# Patient Record
Sex: FEMALE | Race: WHITE | NOT HISPANIC OR LATINO | URBAN - METROPOLITAN AREA
[De-identification: names, ages, dates, MRNs, and addresses within clinical notes are randomized per-mention and may not be internally consistent; named-entity substitution may affect disease eponyms.]

---

## 2022-07-09 ENCOUNTER — TELEPHONE ENCOUNTER (OUTPATIENT)
Dept: URBAN - METROPOLITAN AREA CLINIC 121 | Facility: CLINIC | Age: 72
End: 2022-07-09

## 2022-07-10 ENCOUNTER — TELEPHONE ENCOUNTER (OUTPATIENT)
Dept: URBAN - METROPOLITAN AREA CLINIC 121 | Facility: CLINIC | Age: 72
End: 2022-07-10

## 2022-07-10 RX ORDER — LEVOTHYROXINE SODIUM 125 MCG
TABLET ORAL ONCE A DAY
Refills: 0 | Status: ACTIVE | COMMUNITY
Start: 2014-09-24

## 2022-07-30 ENCOUNTER — TELEPHONE ENCOUNTER (OUTPATIENT)
Age: 72
End: 2022-07-30

## 2022-07-31 ENCOUNTER — TELEPHONE ENCOUNTER (OUTPATIENT)
Age: 72
End: 2022-07-31

## 2023-06-27 ENCOUNTER — WEB ENCOUNTER (OUTPATIENT)
Dept: URBAN - METROPOLITAN AREA CLINIC 7 | Facility: CLINIC | Age: 73
End: 2023-06-27

## 2023-06-27 ENCOUNTER — LAB OUTSIDE AN ENCOUNTER (OUTPATIENT)
Dept: URBAN - METROPOLITAN AREA CLINIC 7 | Facility: CLINIC | Age: 73
End: 2023-06-27

## 2023-06-27 ENCOUNTER — OFFICE VISIT (OUTPATIENT)
Dept: URBAN - METROPOLITAN AREA CLINIC 7 | Facility: CLINIC | Age: 73
End: 2023-06-27
Payer: MEDICARE

## 2023-06-27 VITALS
WEIGHT: 183 LBS | TEMPERATURE: 97.3 F | BODY MASS INDEX: 33.68 KG/M2 | DIASTOLIC BLOOD PRESSURE: 78 MMHG | SYSTOLIC BLOOD PRESSURE: 130 MMHG | HEIGHT: 62 IN

## 2023-06-27 DIAGNOSIS — R74.8 ELEVATED LIVER ENZYMES: ICD-10-CM

## 2023-06-27 DIAGNOSIS — R74.01 ELEVATED LIVER TRANSAMINASE LEVEL: ICD-10-CM

## 2023-06-27 DIAGNOSIS — Z86.010 PERSONAL HISTORY OF COLONIC POLYPS: ICD-10-CM

## 2023-06-27 DIAGNOSIS — K31.89 GASTRIC MASS: ICD-10-CM

## 2023-06-27 DIAGNOSIS — K76.0 FATTY LIVER: ICD-10-CM

## 2023-06-27 DIAGNOSIS — K76.9 LIVER LESION: ICD-10-CM

## 2023-06-27 DIAGNOSIS — R79.89 ELEVATED LIVER FUNCTION TESTS: ICD-10-CM

## 2023-06-27 PROBLEM — 707724006: Status: ACTIVE | Noted: 2023-06-27

## 2023-06-27 PROBLEM — 300297002: Status: ACTIVE | Noted: 2023-06-27

## 2023-06-27 PROBLEM — 863927004: Status: ACTIVE | Noted: 2023-06-27

## 2023-06-27 PROBLEM — 428283002: Status: ACTIVE | Noted: 2023-06-27

## 2023-06-27 PROCEDURE — 99205 OFFICE O/P NEW HI 60 MIN: CPT | Performed by: STUDENT IN AN ORGANIZED HEALTH CARE EDUCATION/TRAINING PROGRAM

## 2023-06-27 RX ORDER — LEVOTHYROXINE SODIUM 125 UG/1
1 TABLET IN THE MORNING ON AN EMPTY STOMACH TABLET ORAL ONCE A DAY
Qty: 30 | Status: ACTIVE | COMMUNITY
Start: 2023-06-27

## 2023-06-27 RX ORDER — ASPIRIN 81 MG/1
1 TABLET TABLET, CHEWABLE ORAL ONCE A DAY
Qty: 30 | Status: ACTIVE | COMMUNITY
Start: 2023-06-27

## 2023-06-27 RX ORDER — METFORMIN HYDROCHLORIDE 500 MG/1
1 TABLET WITH A MEAL TABLET, FILM COATED ORAL ONCE A DAY
Qty: 30 | Status: ACTIVE | COMMUNITY
Start: 2023-06-27

## 2023-06-27 RX ORDER — LEVOTHYROXINE SODIUM 125 MCG
TABLET ORAL ONCE A DAY
Refills: 0 | Status: ON HOLD | COMMUNITY
Start: 2014-09-24

## 2023-06-27 RX ORDER — AMLODIPINE BESYLATE 5 MG/1
TABLET ORAL
Qty: 90 TABLET | Status: ACTIVE | COMMUNITY

## 2023-06-27 NOTE — HPI-TODAY'S VISIT:
73 YO Female presents for elevated liver enzymes.  Previously with mass in stomach suggesitve of lymphoma.  Tattooed but during laproscopy and no mass seen.   No follow up since then.  Needs colonoscopy for polyp surveillance, and repeat EGD for evaluation of previously seen reported lymphoma mask.  CLD for liver enxymes elevations, No FH of CLD, no excessive etoh or acetaminophen use reported.  Fibroscan S3/F0, MRI pancreas liver with evidence of fatty liver, no mass on liver but heterogenous area seen with unknown clinical significant, requested to repeat in 3 months.    ALARM SYMPTOMS --No odynophagia --No hematemesis --No melena / hematochezia --No unintentional weight loss --No iron deficiency anemia  PERTINENT GI FAMILY HISTORY Colon polyps:  no family history Colon cancer:  Mother with UC and ileostomy  GASTROINTESTINAL PROCEDURE HISTORY Colonoscopy:	 --> 8 years ago - history of polyps EGD:   --2004 - mass in stomach stomach, subsequent.    PERTINENT MEDICAL HISTORY Aspirin 81mg PO daily:   --Not Taking Other Blood Thinners:   Cardiac Disease:   --No History  Pulmonary Disease --No History  Abdominal Surgery & Hernia:  No History

## 2023-06-28 ENCOUNTER — LAB OUTSIDE AN ENCOUNTER (OUTPATIENT)
Dept: URBAN - METROPOLITAN AREA CLINIC 7 | Facility: CLINIC | Age: 73
End: 2023-06-28

## 2023-06-28 PROBLEM — 300331000: Status: ACTIVE | Noted: 2023-06-28

## 2023-07-05 ENCOUNTER — TELEPHONE ENCOUNTER (OUTPATIENT)
Dept: URBAN - METROPOLITAN AREA CLINIC 7 | Facility: CLINIC | Age: 73
End: 2023-07-05

## 2023-07-05 RX ORDER — SOD SULF/POT CHLORIDE/MAG SULF 1.479 G
AS DIRECTED TABLET ORAL ONCE
Qty: 1 KIT | Refills: 0 | OUTPATIENT
Start: 2023-07-05

## 2023-07-06 ENCOUNTER — CLAIMS CREATED FROM THE CLAIM WINDOW (OUTPATIENT)
Dept: URBAN - METROPOLITAN AREA SURGERY CENTER 5 | Facility: SURGERY CENTER | Age: 73
End: 2023-07-06
Payer: MEDICARE

## 2023-07-06 ENCOUNTER — CLAIMS CREATED FROM THE CLAIM WINDOW (OUTPATIENT)
Dept: URBAN - METROPOLITAN AREA CLINIC 4 | Facility: CLINIC | Age: 73
End: 2023-07-06
Payer: MEDICARE

## 2023-07-06 ENCOUNTER — TELEPHONE ENCOUNTER (OUTPATIENT)
Dept: URBAN - METROPOLITAN AREA CLINIC 7 | Facility: CLINIC | Age: 73
End: 2023-07-06

## 2023-07-06 DIAGNOSIS — Z86.010 PERSONAL HISTORY OF COLONIC POLYPS: ICD-10-CM

## 2023-07-06 DIAGNOSIS — K64.1 SECOND DEGREE HEMORRHOIDS: ICD-10-CM

## 2023-07-06 DIAGNOSIS — K29.70 GASTRITIS, UNSPECIFIED, WITHOUT BLEEDING: ICD-10-CM

## 2023-07-06 DIAGNOSIS — K29.70 GASTRITIS WITHOUT BLEEDING, UNSPECIFIED CHRONICITY, UNSPECIFIED GASTRITIS TYPE: ICD-10-CM

## 2023-07-06 DIAGNOSIS — K57.30 DIVERTICULOSIS OF LARGE INTESTINE WITHOUT PERFORATION OR ABSCESS WITHOUT BLEEDING: ICD-10-CM

## 2023-07-06 DIAGNOSIS — K22.89 OTHER SPECIFIED DISEASE OF ESOPHAGUS: ICD-10-CM

## 2023-07-06 DIAGNOSIS — C85.93 LYMPHOMA OF GASTROINTESTINAL TRACT: ICD-10-CM

## 2023-07-06 DIAGNOSIS — K31.89 OTHER DISEASES OF STOMACH AND DUODENUM: ICD-10-CM

## 2023-07-06 DIAGNOSIS — K29.60 OTHER GASTRITIS WITHOUT BLEEDING: ICD-10-CM

## 2023-07-06 PROBLEM — 4556007: Status: ACTIVE | Noted: 2023-07-06

## 2023-07-06 PROBLEM — 196731005: Status: ACTIVE | Noted: 2023-07-06

## 2023-07-06 PROBLEM — 724538004: Status: ACTIVE | Noted: 2023-07-06

## 2023-07-06 PROCEDURE — 00813 ANES UPR LWR GI NDSC PX: CPT | Performed by: NURSE ANESTHETIST, CERTIFIED REGISTERED

## 2023-07-06 PROCEDURE — 88313 SPECIAL STAINS GROUP 2: CPT | Performed by: PATHOLOGY

## 2023-07-06 PROCEDURE — G0121 COLON CA SCRN NOT HI RSK IND: HCPCS | Performed by: STUDENT IN AN ORGANIZED HEALTH CARE EDUCATION/TRAINING PROGRAM

## 2023-07-06 PROCEDURE — 43239 EGD BIOPSY SINGLE/MULTIPLE: CPT | Performed by: CLINIC/CENTER

## 2023-07-06 PROCEDURE — 43239 EGD BIOPSY SINGLE/MULTIPLE: CPT | Performed by: STUDENT IN AN ORGANIZED HEALTH CARE EDUCATION/TRAINING PROGRAM

## 2023-07-06 PROCEDURE — 88305 TISSUE EXAM BY PATHOLOGIST: CPT | Performed by: PATHOLOGY

## 2023-07-06 PROCEDURE — G0121 COLON CA SCRN NOT HI RSK IND: HCPCS | Performed by: CLINIC/CENTER

## 2023-07-06 PROCEDURE — 88342 IMHCHEM/IMCYTCHM 1ST ANTB: CPT | Performed by: PATHOLOGY

## 2023-07-06 RX ORDER — LEVOTHYROXINE SODIUM 125 UG/1
1 TABLET IN THE MORNING ON AN EMPTY STOMACH TABLET ORAL ONCE A DAY
Qty: 30 | Status: ACTIVE | COMMUNITY
Start: 2023-06-27

## 2023-07-06 RX ORDER — METFORMIN HYDROCHLORIDE 500 MG/1
1 TABLET WITH A MEAL TABLET, FILM COATED ORAL ONCE A DAY
Qty: 30 | Status: ACTIVE | COMMUNITY
Start: 2023-06-27

## 2023-07-06 RX ORDER — PANTOPRAZOLE SODIUM 40 MG/1
1 TABLET TABLET, DELAYED RELEASE ORAL ONCE A DAY
Qty: 30 | Refills: 3 | OUTPATIENT
Start: 2023-07-06

## 2023-07-06 RX ORDER — LEVOTHYROXINE SODIUM 125 MCG
TABLET ORAL ONCE A DAY
Refills: 0 | Status: ON HOLD | COMMUNITY
Start: 2014-09-24

## 2023-07-06 RX ORDER — SOD SULF/POT CHLORIDE/MAG SULF 1.479 G
AS DIRECTED TABLET ORAL ONCE
Qty: 1 KIT | Refills: 0 | Status: ACTIVE | COMMUNITY
Start: 2023-07-05

## 2023-07-06 RX ORDER — ASPIRIN 81 MG/1
1 TABLET TABLET, CHEWABLE ORAL ONCE A DAY
Qty: 30 | Status: ACTIVE | COMMUNITY
Start: 2023-06-27

## 2023-07-06 RX ORDER — AMLODIPINE BESYLATE 5 MG/1
TABLET ORAL
Qty: 90 TABLET | Status: ACTIVE | COMMUNITY

## 2023-07-13 ENCOUNTER — WEB ENCOUNTER (OUTPATIENT)
Dept: URBAN - METROPOLITAN AREA CLINIC 7 | Facility: CLINIC | Age: 73
End: 2023-07-13

## 2023-08-08 ENCOUNTER — TELEPHONE ENCOUNTER (OUTPATIENT)
Dept: URBAN - METROPOLITAN AREA CLINIC 7 | Facility: CLINIC | Age: 73
End: 2023-08-08

## 2023-08-22 ENCOUNTER — OFFICE VISIT (OUTPATIENT)
Dept: URBAN - METROPOLITAN AREA CLINIC 7 | Facility: CLINIC | Age: 73
End: 2023-08-22
Payer: MEDICARE

## 2023-08-22 VITALS
TEMPERATURE: 97.2 F | DIASTOLIC BLOOD PRESSURE: 80 MMHG | WEIGHT: 185 LBS | SYSTOLIC BLOOD PRESSURE: 124 MMHG | BODY MASS INDEX: 34.04 KG/M2 | HEIGHT: 62 IN

## 2023-08-22 DIAGNOSIS — K76.0 FATTY LIVER: ICD-10-CM

## 2023-08-22 DIAGNOSIS — R74.01 ELEVATED LIVER TRANSAMINASE LEVEL: ICD-10-CM

## 2023-08-22 DIAGNOSIS — Z86.010 PERSONAL HISTORY OF COLONIC POLYPS: ICD-10-CM

## 2023-08-22 DIAGNOSIS — K29.60 OTHER GASTRITIS WITHOUT BLEEDING: ICD-10-CM

## 2023-08-22 DIAGNOSIS — C85.93 LYMPHOMA OF GASTROINTESTINAL TRACT: ICD-10-CM

## 2023-08-22 PROCEDURE — 99214 OFFICE O/P EST MOD 30 MIN: CPT | Performed by: STUDENT IN AN ORGANIZED HEALTH CARE EDUCATION/TRAINING PROGRAM

## 2023-08-22 RX ORDER — SOD SULF/POT CHLORIDE/MAG SULF 1.479 G
AS DIRECTED TABLET ORAL ONCE
Qty: 1 KIT | Refills: 0 | Status: ACTIVE | COMMUNITY
Start: 2023-07-05

## 2023-08-22 RX ORDER — LEVOTHYROXINE SODIUM 125 UG/1
1 TABLET IN THE MORNING ON AN EMPTY STOMACH TABLET ORAL ONCE A DAY
Qty: 30 | Status: ACTIVE | COMMUNITY
Start: 2023-06-27

## 2023-08-22 RX ORDER — AMLODIPINE BESYLATE 5 MG/1
TABLET ORAL
Qty: 90 TABLET | Status: ACTIVE | COMMUNITY

## 2023-08-22 RX ORDER — METFORMIN HYDROCHLORIDE 500 MG/1
1 TABLET WITH A MEAL TABLET, FILM COATED ORAL ONCE A DAY
Qty: 30 | Status: ACTIVE | COMMUNITY
Start: 2023-06-27

## 2023-08-22 RX ORDER — PANTOPRAZOLE SODIUM 40 MG/1
1 TABLET TABLET, DELAYED RELEASE ORAL ONCE A DAY
Qty: 30 | Refills: 3 | Status: ACTIVE | COMMUNITY
Start: 2023-07-06

## 2023-08-22 RX ORDER — LEVOTHYROXINE SODIUM 125 MCG
TABLET ORAL ONCE A DAY
Refills: 0 | Status: ACTIVE | COMMUNITY
Start: 2014-09-24

## 2023-08-22 RX ORDER — PANTOPRAZOLE SODIUM 40 MG/1
1 TABLET TABLET, DELAYED RELEASE ORAL ONCE A DAY
Qty: 30 | Refills: 3 | OUTPATIENT
Start: 2023-08-22

## 2023-08-22 RX ORDER — ASPIRIN 81 MG/1
1 TABLET TABLET, CHEWABLE ORAL ONCE A DAY
Qty: 30 | Status: ACTIVE | COMMUNITY
Start: 2023-06-27

## 2023-08-22 NOTE — HPI-TODAY'S VISIT:
71 YO Female presents for elevated liver enzymes.  Previously with mass in stomach suggesitve of lymphoma.  Tattooed but during laproscopy and no mass seen.   No follow up since then.  Needs colonoscopy for polyp surveillance, and repeat EGD for evaluation of previously seen reported lymphoma mask.  CLD for liver enxymes elevations, No FH of CLD, no excessive etoh or acetaminophen use reported.  Fibroscan S3/F0, MRI pancreas liver with evidence of fatty liver, no mass on liver but heterogenous area seen with unknown clinical significant, requested to repeat in 3 months.     8/22/23: Pathology report reviewed with patient.  States she was not previously taking PPI as prescribed after procedure.  Medication resent.  Currently asymptomatic but stressed that since there was biopsy proven inflammation it is important to take the medication.    PERTINENT GI FAMILY HISTORY Colon polyps:  no family history Colon cancer:  Mother with UC and ileostomy  GASTROINTESTINAL PROCEDURE HISTORY Colonoscopy:	 --> 8 years ago - history of polyps, 2023 EGD:   --2004 - mass in stomach stomach, subsequent.  2023  PERTINENT MEDICAL HISTORY Aspirin 81mg PO daily:   --Not Taking Other Blood Thinners:   Cardiac Disease:   --No History  Pulmonary Disease --No History  Abdominal Surgery & Hernia:  No History

## 2024-04-24 ENCOUNTER — OV EP (OUTPATIENT)
Dept: URBAN - METROPOLITAN AREA CLINIC 60 | Facility: CLINIC | Age: 74
End: 2024-04-24
Payer: MEDICARE

## 2024-04-24 VITALS
RESPIRATION RATE: 12 BRPM | OXYGEN SATURATION: 98 % | TEMPERATURE: 98.8 F | HEART RATE: 93 BPM | HEIGHT: 62 IN | SYSTOLIC BLOOD PRESSURE: 136 MMHG | BODY MASS INDEX: 34.82 KG/M2 | WEIGHT: 189.2 LBS | DIASTOLIC BLOOD PRESSURE: 72 MMHG

## 2024-04-24 DIAGNOSIS — C85.93 LYMPHOMA OF GASTROINTESTINAL TRACT: ICD-10-CM

## 2024-04-24 DIAGNOSIS — Z86.010 PERSONAL HISTORY OF COLONIC POLYPS: ICD-10-CM

## 2024-04-24 DIAGNOSIS — R74.01 ELEVATED LIVER TRANSAMINASE LEVEL: ICD-10-CM

## 2024-04-24 DIAGNOSIS — K76.0 FATTY LIVER: ICD-10-CM

## 2024-04-24 PROCEDURE — 99214 OFFICE O/P EST MOD 30 MIN: CPT | Performed by: PHYSICIAN ASSISTANT

## 2024-04-24 RX ORDER — METFORMIN HYDROCHLORIDE 500 MG/1
1 TABLET WITH A MEAL TABLET, FILM COATED ORAL ONCE A DAY
Qty: 30 | Status: ACTIVE | COMMUNITY
Start: 2023-06-27

## 2024-04-24 RX ORDER — SIMVASTATIN 20 MG
1 TABLET IN THE EVENING TABLET ORAL ONCE A DAY
Status: ACTIVE | COMMUNITY

## 2024-04-24 RX ORDER — LEVOTHYROXINE SODIUM 125 UG/1
1 TABLET IN THE MORNING ON AN EMPTY STOMACH TABLET ORAL ONCE A DAY
Qty: 30 | Status: ACTIVE | COMMUNITY
Start: 2023-06-27

## 2024-04-24 RX ORDER — ASPIRIN 81 MG/1
1 TABLET TABLET, CHEWABLE ORAL ONCE A DAY
Qty: 30 | Status: ACTIVE | COMMUNITY
Start: 2023-06-27

## 2024-04-24 RX ORDER — AMLODIPINE BESYLATE 5 MG/1
TABLET ORAL
Qty: 90 TABLET | Status: ACTIVE | COMMUNITY

## 2024-04-24 RX ORDER — PANTOPRAZOLE SODIUM 40 MG/1
1 TABLET TABLET, DELAYED RELEASE ORAL ONCE A DAY
Qty: 30 | Refills: 3 | Status: ACTIVE | COMMUNITY
Start: 2023-08-22

## 2024-04-24 RX ORDER — PANTOPRAZOLE SODIUM 40 MG/1
1 TABLET TABLET, DELAYED RELEASE ORAL ONCE A DAY
Qty: 30 | Refills: 3 | Status: ACTIVE | COMMUNITY
Start: 2023-07-06

## 2024-04-24 NOTE — HPI-TODAY'S VISIT:
73-year-old female with hypertension, type 2 diabetes, history of breast cancer, fatty liver, elevated LFTs, history of mass in the stomach in 2004 suggestive of lymphoma which was tattooed but during laparoscopy there was no mass seen.  She previously followed with [facility] and before that she followed in our office with Dr. Valdes. She was last seen by Dr. Kincaid on August 22, 2023.  She was referred at that time for elevated liver enzymes.  She had a FibroScan in June 2023 which demonstrated S3/F2 0, MRI showed evidence of fatty liver, no mass on the liver but heterogenous area seen with unknown clinical significance, repeat imaging was recommended in 3 months.  Full hepatic panel was done in June 2023 which was negative.  She presents to our office today doing well. She has no GI complaints. States she was referred in by her PCP for elevated liver enzymes. She has no pyrosis, dysphagia, odynophagia, nausea, vomiting, abdominal pain, constipation, diarrhea, melena, hematochezia.   Labs were done on February 14, 2024.  CBC was normal.  Her CMP demonstrated a normal liver profile with AST 33, ALT 30, alkaline phosphatase 86, total bilirubin 0.5.  HbA1c 6.2%.  TSH 2.15.  EGD 7/6/2023 to assess GERD symptoms and for history of lymphoma of the GI tract: [place]-colored mucosa suspicious for short segment Remy's esophagus.  Biopsy of the GE junction showed inflammation but was negative for intestinal metaplasia.  Gastritis was found in the stomach.  Gastric biopsy was negative for H. pylori.  No gross lesions in the entire examined duodenum.  Colonoscopy for personal history of colon polyps 7/6/2023:Normal colonoscopy to the cecum with the exception of left-sided diverticulosis and grade 2 internal hemorrhoids.  Repeat colonoscopy was recommended in 5 years.  MRI 3/11/2024:Diffuse hepatic steatosis without suspicious hepatic lesion.  Mild left intrahepatic ductal dilation without abrupt cutoff or obstruction.  Normal caliber CBD measuring up to 6 mm.  Gallbladder polyp versus sludge at the neck, unchanged.  Right upper quadrant ultrasound 4/16/2024:Hepatic steatosis.  Tiny polyp versus gallstone in the neck.  No cholecystitis.  Mild right hydronephrosis of indeterminate etiology.

## 2024-06-13 ENCOUNTER — LAB OUTSIDE AN ENCOUNTER (OUTPATIENT)
Dept: URBAN - METROPOLITAN AREA CLINIC 63 | Facility: CLINIC | Age: 74
End: 2024-06-13

## 2024-06-24 ENCOUNTER — LAB OUTSIDE AN ENCOUNTER (OUTPATIENT)
Dept: URBAN - METROPOLITAN AREA CLINIC 60 | Facility: CLINIC | Age: 74
End: 2024-06-24

## 2024-07-05 ENCOUNTER — OFFICE VISIT (OUTPATIENT)
Dept: URBAN - METROPOLITAN AREA CLINIC 60 | Facility: CLINIC | Age: 74
End: 2024-07-05
Payer: MEDICARE

## 2024-07-05 ENCOUNTER — DASHBOARD ENCOUNTERS (OUTPATIENT)
Age: 74
End: 2024-07-05

## 2024-07-05 VITALS
OXYGEN SATURATION: 95 % | DIASTOLIC BLOOD PRESSURE: 80 MMHG | WEIGHT: 189 LBS | SYSTOLIC BLOOD PRESSURE: 134 MMHG | TEMPERATURE: 98.2 F | BODY MASS INDEX: 34.78 KG/M2 | HEIGHT: 62 IN | HEART RATE: 81 BPM

## 2024-07-05 DIAGNOSIS — R74.01 ELEVATED LIVER TRANSAMINASE LEVEL: ICD-10-CM

## 2024-07-05 DIAGNOSIS — Z86.010 PERSONAL HISTORY OF COLONIC POLYPS: ICD-10-CM

## 2024-07-05 DIAGNOSIS — K76.0 FATTY LIVER: ICD-10-CM

## 2024-07-05 DIAGNOSIS — C85.93 LYMPHOMA OF GASTROINTESTINAL TRACT: ICD-10-CM

## 2024-07-05 PROCEDURE — 99214 OFFICE O/P EST MOD 30 MIN: CPT | Performed by: PHYSICIAN ASSISTANT

## 2024-07-05 RX ORDER — OSELTAMIVIR PHOSPHATE 75 MG/1
CAPSULE ORAL
Qty: 10 CAPSULE | Status: ACTIVE | COMMUNITY

## 2024-07-05 RX ORDER — ASPIRIN 81 MG/1
1 TABLET TABLET, CHEWABLE ORAL ONCE A DAY
Qty: 30 | Status: ACTIVE | COMMUNITY
Start: 2023-06-27

## 2024-07-05 RX ORDER — LEVOTHYROXINE SODIUM 112 UG/1
TABLET ORAL
Qty: 90 TABLET | Status: ACTIVE | COMMUNITY

## 2024-07-05 RX ORDER — SIMVASTATIN 20 MG
1 TABLET IN THE EVENING TABLET ORAL ONCE A DAY
Status: ACTIVE | COMMUNITY

## 2024-07-05 RX ORDER — ONDANSETRON 4 MG/1
PLACE ONE TABLET ON THE TONGUE EVERY 6 HOURS AS NEEDED FOR NAUSEA AND VOMITING TABLET, ORALLY DISINTEGRATING ORAL
Qty: 10 UNSPECIFIED | Refills: 0 | Status: ACTIVE | COMMUNITY

## 2024-07-05 RX ORDER — METFORMIN HYDROCHLORIDE 500 MG/1
1 TABLET WITH A MEAL TABLET, FILM COATED ORAL ONCE A DAY
Qty: 30 | Status: ACTIVE | COMMUNITY
Start: 2023-06-27

## 2024-07-05 RX ORDER — PANTOPRAZOLE SODIUM 40 MG/1
1 TABLET TABLET, DELAYED RELEASE ORAL ONCE A DAY
Qty: 30 | Refills: 3 | Status: ACTIVE | COMMUNITY
Start: 2023-08-22

## 2024-07-05 RX ORDER — AMLODIPINE BESYLATE 5 MG/1
TABLET ORAL
Qty: 90 TABLET | Status: ACTIVE | COMMUNITY

## 2024-07-05 NOTE — HPI-TODAY'S VISIT:
73-year-old female with hypertension, type 2 diabetes, history of breast cancer, fatty liver, elevated LFTs, history of mass in the stomach in 2004 suggestive of lymphoma which was tattooed but during laparoscopically there was no mass seen.  She previously followed with a [facility] and before that she followed with Dr. Valdes. She was last seen in our office in April 2024 doing well.  She had no GI complaints.  She reported that she was referred by her PCP for elevated liver enzymes. She had labs done in February 2024.  Her labs demonstrated a normal liver profile with AST 33 and ALT 30, alkaline phosphatase and total bilirubin were normal.  HbA1c 6.2%, TSH 2.15. She follows up today after having FibroScan on June 13, 2024.  Her FibroScan demonstrated severe steatosis (S3) with CAP score 325 dB/m.  No fibrosis (F0) 4.6K PA. She follows up today doing well.  She has no GI complaints.  Her weight is unchanged at 189 pounds since her last office visit in April 2024. MRI 3/11/2024:Diffuse hepatic steatosis without suspicious hepatic lesion.  Mild left intrahepatic ductal dilation without abrupt cutoff or obstruction.  Normal caliber CBD measuring up to 6 mm.  Gallbladder polyp versus sludge at the neck, unchanged. Right upper quadrant ultrasound 4/16/2024:Hepatic steatosis.  Tiny polyp versus gallstone in the neck.  No cholecystitis.  Mild right hydronephrosis of indeterminate etiology. FibroScan June 2023 demonstrated S3/S0. EGD 7/6/2023 to assess GERD symptoms and for history of lymphoma of the GI tract: [place]-colored mucosa suspicious for short segment Remy's esophagus.  Biopsy of the GE junction showed inflammation but was negative for intestinal metaplasia.  Gastritis was found in the stomach.  Gastric biopsy was negative for H. pylori.  No gross lesions in the entire examined stomach. Colonoscopy 7/6/2023 for high risk surveillance with personal history of colon polyps: Normal colonoscopy to the cecum with the exception of left-sided diverticulosis and grade 2 internal hemorrhoids.  Repeat colonoscopy was recommended in 5 years.

## 2025-03-20 ENCOUNTER — TELEPHONE ENCOUNTER (OUTPATIENT)
Dept: URBAN - METROPOLITAN AREA CLINIC 63 | Facility: CLINIC | Age: 75
End: 2025-03-20

## 2025-06-05 ENCOUNTER — WEB ENCOUNTER (OUTPATIENT)
Dept: URBAN - METROPOLITAN AREA CLINIC 60 | Facility: CLINIC | Age: 75
End: 2025-06-05